# Patient Record
Sex: FEMALE | NOT HISPANIC OR LATINO | URBAN - METROPOLITAN AREA
[De-identification: names, ages, dates, MRNs, and addresses within clinical notes are randomized per-mention and may not be internally consistent; named-entity substitution may affect disease eponyms.]

---

## 2017-11-28 ENCOUNTER — IMPORTED ENCOUNTER (OUTPATIENT)
Dept: URBAN - METROPOLITAN AREA CLINIC 31 | Facility: CLINIC | Age: 62
End: 2017-11-28

## 2017-11-28 PROBLEM — H43.813: Noted: 2017-11-28

## 2017-11-28 PROBLEM — H25.813: Noted: 2017-11-28

## 2017-11-28 PROCEDURE — 92014 COMPRE OPH EXAM EST PT 1/>: CPT

## 2017-11-28 PROCEDURE — 92310 CONTACT LENS FITTING OU: CPT

## 2017-11-28 NOTE — PATIENT DISCUSSION
1. Combined Types of Cataract OU: Explained how cataracts can effect vision. Recommend clinical observation. The patient was advised to contact us if any change or worsening of vision. 2. PVD OU:  Patient was cautioned to call our office immediately if they experience a substantial change in their symptoms such as an increase in floaters persistent flashes loss of visual field (may appear as a shadow or a curtain) or decrease in visual acuity as these may indicate a retinal tear or detachment. If this is a new problem patient will need to return for re-examination  as determined by the [address]. Refractive error4. Order trials CL with updated power OS. Can collect and if satisfied can order. 5. Return for an appointment in 1 year for comprehensive exam. with Dr. Thuan Coleman.

## 2019-01-16 ENCOUNTER — IMPORTED ENCOUNTER (OUTPATIENT)
Dept: URBAN - METROPOLITAN AREA CLINIC 31 | Facility: CLINIC | Age: 64
End: 2019-01-16

## 2019-01-16 PROBLEM — H25.813: Noted: 2019-01-16

## 2019-01-16 PROBLEM — H43.813: Noted: 2019-01-16

## 2019-01-16 PROCEDURE — 92014 COMPRE OPH EXAM EST PT 1/>: CPT

## 2019-01-16 PROCEDURE — 92015 DETERMINE REFRACTIVE STATE: CPT

## 2019-01-16 PROCEDURE — 92310 CONTACT LENS FITTING OU: CPT

## 2019-01-16 NOTE — PATIENT DISCUSSION
1. Combined Types of Cataract OU: Explained how cataracts can effect vision. Recommend clinical observation. The patient was advised to contact us if any change or worsening of vision. 2. PVD OU:  Patient was cautioned to call our office immediately if they experience a substantial change in their symptoms such as an increase in floaters persistent flashes loss of visual field (may appear as a shadow or a curtain) or decrease in visual acuity as these may indicate a retinal tear or detachment. If this is a new problem patient will need to return for re-examination  as determined by the [address]. Refractive error - distnace VA problems most likely a combination of mono and cataract changes. Try toric distance OU for golf and can continue with near OS toric for rest of the day. 4. Return for an appointment in 1 year for comprehensive exam. with Dr. Sary Stewart

## 2019-01-16 NOTE — PATIENT DISCUSSION
Refractive error - distnace VA problems most likely a combination of mono and cataract changes. Try toric distance OU for golf and can continue with near OS toric for rest of the day.

## 2020-12-02 ENCOUNTER — IMPORTED ENCOUNTER (OUTPATIENT)
Dept: URBAN - METROPOLITAN AREA CLINIC 31 | Facility: CLINIC | Age: 65
End: 2020-12-02

## 2020-12-02 PROBLEM — H25.813: Noted: 2020-12-02

## 2020-12-02 PROBLEM — H43.813: Noted: 2020-12-02

## 2020-12-02 PROCEDURE — 92014 COMPRE OPH EXAM EST PT 1/>: CPT

## 2020-12-02 PROCEDURE — 92015 DETERMINE REFRACTIVE STATE: CPT

## 2020-12-02 NOTE — PATIENT DISCUSSION
1. Combined Types of Cataract OU: stable. Monitor. 2. PVD OU:  Monitor. 3. Refractive error - happy with MFs prescribed by OD sister. Will call to confirm brand and locke. 4. Return for an appointment in 1 year for comprehensive exam with Dr. Samson Weinstein.

## 2021-12-10 ENCOUNTER — OFFICE VISIT (OUTPATIENT)
Dept: URBAN - METROPOLITAN AREA CLINIC 121 | Facility: CLINIC | Age: 66
End: 2021-12-10

## 2022-02-08 ENCOUNTER — OFFICE VISIT (OUTPATIENT)
Dept: URBAN - METROPOLITAN AREA CLINIC 63 | Facility: CLINIC | Age: 67
End: 2022-02-08

## 2022-03-23 ENCOUNTER — IMPORTED ENCOUNTER (OUTPATIENT)
Dept: URBAN - METROPOLITAN AREA CLINIC 31 | Facility: CLINIC | Age: 67
End: 2022-03-23

## 2022-03-23 PROBLEM — H43.813: Noted: 2022-03-23

## 2022-03-23 PROBLEM — H25.813: Noted: 2022-03-23

## 2022-03-23 PROCEDURE — 99214 OFFICE O/P EST MOD 30 MIN: CPT

## 2022-03-23 PROCEDURE — 92015 DETERMINE REFRACTIVE STATE: CPT

## 2022-03-23 NOTE — PATIENT DISCUSSION
1. Combined Types of Cataract OU: stable. Monitor. 2. PVD OU:  Monitor. 3. Refractive error - Glasses change optional. Continue with present contact lens modality. 4. Return for an appointment in 1 year for comprehensive exam with Dr. Rudolph Levy.

## 2022-03-28 ENCOUNTER — OFFICE VISIT (OUTPATIENT)
Dept: URBAN - METROPOLITAN AREA SURGERY CENTER 4 | Facility: SURGERY CENTER | Age: 67
End: 2022-03-28

## 2022-03-30 LAB — PATHOLOGY (INDENTED REPORT): (no result)

## 2022-04-02 ASSESSMENT — VISUAL ACUITY
OS_SC: 20/20-1
OS_SC: 20/20
OU_SC: 20/20
OD_SC: 20/20-1
OD_SC: 20/20
OU_CC: J1+-1

## 2022-04-02 ASSESSMENT — TONOMETRY
OD_IOP_MMHG: 16
OS_IOP_MMHG: 15
OS_IOP_MMHG: 13
OD_IOP_MMHG: 15
OS_IOP_MMHG: 17
OD_IOP_MMHG: 13
OD_IOP_MMHG: 14
OS_IOP_MMHG: 14

## 2022-07-09 ENCOUNTER — TELEPHONE ENCOUNTER (OUTPATIENT)
Dept: URBAN - METROPOLITAN AREA CLINIC 121 | Facility: CLINIC | Age: 67
End: 2022-07-09

## 2022-07-09 RX ORDER — CALCIUM NO.38/D3/MAG/BORON ASP 500MG/15ML
LIQUID (ML) ORAL TAKE AS DIRECTED
Refills: 0 | OUTPATIENT
Start: 2011-09-13 | End: 2022-02-08

## 2022-07-09 RX ORDER — OMEGA-3S/DHA/EPA/FISH OIL 980-1400MG
CAPSULE,DELAYED RELEASE (ENTERIC COATED) ORAL TAKE AS DIRECTED
Refills: 0 | OUTPATIENT
Start: 2011-09-13 | End: 2022-02-08

## 2022-07-09 RX ORDER — ESCITALOPRAM OXALATE 5 MG/1
TABLET, FILM COATED ORAL TAKE AS DIRECTED
Refills: 0 | OUTPATIENT
Start: 2011-09-13 | End: 2022-02-08

## 2022-07-10 ENCOUNTER — TELEPHONE ENCOUNTER (OUTPATIENT)
Dept: URBAN - METROPOLITAN AREA CLINIC 121 | Facility: CLINIC | Age: 67
End: 2022-07-10

## 2022-07-10 RX ORDER — OXYMETAZOLINE HCL 0.05 %
SPRAY, NON-AEROSOL (ML) NASAL AS NEEDED
Refills: 0 | Status: ACTIVE | COMMUNITY
Start: 2022-02-08

## 2022-07-10 RX ORDER — FLUTICASONE PROPIONATE 50 UG/1
SPRAY, METERED NASAL AS NEEDED
Refills: 0 | Status: ACTIVE | COMMUNITY
Start: 2022-02-08

## 2022-07-10 RX ORDER — ESCITALOPRAM 10 MG/1
TABLET, FILM COATED ORAL ONCE A DAY
Refills: 0 | Status: ACTIVE | COMMUNITY
Start: 2022-02-08

## 2022-07-10 RX ORDER — ONDANSETRON 4 MG/1
USE AS DIRECTED ONE TABLET 30 MINUTES BEFORE EACH DOSE OF LAXATIVE FOR BOWEL PREP TABLET, ORALLY DISINTEGRATING ORAL
Refills: 0 | Status: ACTIVE | COMMUNITY
Start: 2022-02-08

## 2022-07-10 RX ORDER — AMLODIPINE BESYLATE 2.5 MG/1
TABLET ORAL ONCE A DAY
Refills: 0 | Status: ACTIVE | COMMUNITY
Start: 2022-02-08

## 2023-07-12 NOTE — PATIENT DISCUSSION
Instructed to call immediately if any new distortion, blurring, decreased vision or eye pain. [Negative] : Neurological [Abdominal Pain] : no abdominal pain [Nausea] : no nausea [Vomiting] : no vomiting

## 2023-12-01 ENCOUNTER — COMPREHENSIVE EXAM (OUTPATIENT)
Dept: URBAN - METROPOLITAN AREA CLINIC 29 | Facility: CLINIC | Age: 68
End: 2023-12-01

## 2023-12-01 DIAGNOSIS — H52.203: ICD-10-CM

## 2023-12-01 DIAGNOSIS — H25.813: ICD-10-CM

## 2023-12-01 DIAGNOSIS — H52.4: ICD-10-CM

## 2023-12-01 DIAGNOSIS — H52.13: ICD-10-CM

## 2023-12-01 PROCEDURE — 99214 OFFICE O/P EST MOD 30 MIN: CPT

## 2023-12-01 PROCEDURE — 92015 DETERMINE REFRACTIVE STATE: CPT

## 2023-12-01 PROCEDURE — 92310-4 LEVEL 4 CONTACT LENS MANAGEMENT

## 2023-12-01 ASSESSMENT — TONOMETRY
OS_IOP_MMHG: 15
OD_IOP_MMHG: 14

## 2023-12-19 ENCOUNTER — CONTACT LENSES/GLASSES VISIT (OUTPATIENT)
Dept: URBAN - METROPOLITAN AREA CLINIC 29 | Facility: CLINIC | Age: 68
End: 2023-12-19

## 2023-12-19 DIAGNOSIS — H52.13: ICD-10-CM

## 2023-12-19 PROCEDURE — 92310F

## 2023-12-19 ASSESSMENT — VISUAL ACUITY
OS_CC: J2
OD_CC: J7
OD_CC: 20/20

## 2023-12-27 ENCOUNTER — CONTACT LENSES/GLASSES VISIT (OUTPATIENT)
Dept: URBAN - METROPOLITAN AREA CLINIC 29 | Facility: CLINIC | Age: 68
End: 2023-12-27

## 2023-12-27 DIAGNOSIS — H52.13: ICD-10-CM

## 2023-12-27 PROCEDURE — 92310F

## 2024-12-03 ENCOUNTER — COMPREHENSIVE EXAM (OUTPATIENT)
Age: 69
End: 2024-12-03

## 2024-12-03 DIAGNOSIS — H52.4: ICD-10-CM

## 2024-12-03 DIAGNOSIS — H43.813: ICD-10-CM

## 2024-12-03 DIAGNOSIS — H52.13: ICD-10-CM

## 2024-12-03 DIAGNOSIS — H25.813: ICD-10-CM

## 2024-12-03 DIAGNOSIS — H52.223: ICD-10-CM

## 2024-12-03 PROCEDURE — 92014 COMPRE OPH EXAM EST PT 1/>: CPT

## 2024-12-03 PROCEDURE — 92310-2 LEVEL 2 SOFT LENS UPDATE: Mod: 21

## 2024-12-03 PROCEDURE — 92015 DETERMINE REFRACTIVE STATE: CPT
